# Patient Record
Sex: FEMALE | Race: WHITE
[De-identification: names, ages, dates, MRNs, and addresses within clinical notes are randomized per-mention and may not be internally consistent; named-entity substitution may affect disease eponyms.]

---

## 2018-11-14 NOTE — PROC
[address]  [address]                    PROCEDURE REPORT              
_______________________________________________________________________________
 
Name:       DARSHAN HOOVER                Room:                      [facility]#:  [id number]      Account #:      [id number]  
Admission:  11/14/18     Attend Phys:    Thaddeus English DO 
Discharge:               Date of Birth:  [birth year]  
         Report #: [id number]
                                                                                
_______________________________________________________________________________
THIS REPORT FOR:  //name//                      
 
For GI report, please see the Provation report in Perceptive 7 content.
 
 
 
 
 
 
 
 
 
 
 
 
 
 
 
 
 
 
 
 
 
 
 
 
 
 
 
 
 
 
 
 
 
 
 
 
 
 
 
 
 
                       
                                        By:                                
                 
D: 11/14/18     _______________________________________
T: 11/17/18 1614Medical Records Staff [facility]       /AL

## 2018-11-14 NOTE — EKG
[place] [address]
Phone:  [phone]                     ELECTROCARDIOGRAM REPORT      
_______________________________________________________________________________
 
Name:       SNEHAKYM HUERTANDA S                Room:                      [facility]#:  [id number]      Account #:      [id number]  
Admission:  18     Attend Phys:    Thaddeus English DO 
Discharge:               Date of Birth:  [birth year]  
         Report #: [id number]
    [id number]
_______________________________________________________________________________
THIS REPORT FOR:  //name//                      
 
                          [facility]
                                       
Test Date:    2018               Test Time:    08:41:03
Pat Name:     DARSHAN HOOVER            Department:   
Patient ID:   [id number]            Room:          
Gender:       F                        Technician:   
:          1965               Requested By: Thaddeus English
Order Number: [id number]    Nikki MD:   Nestor Alcazar
                                 Measurements
Intervals                              Axis          
Rate:         68                       P:            -14
LA:           186                      QRS:          36
QRSD:         95                       T:            18
QT:           429                                    
QTc:          457                                    
                           Interpretive Statements
Sinus rhythm
ST elev, probable normal early repol pattern
Compared to ECG 2017 12:41:06
No significant changes
 
Electronically Signed On 2018 11:42:17 CST by Nestor Alcazar
[email or web address]
 
 
 
 
 
 
 
 
 
 
 
 
 
 
 
 
 
 
  <ELECTRONICALLY SIGNED>
                                           By: Nestor Alcazar MD, [facility]      
  18     1142
D: 18   _____________________________________
T: 18   Nestor Alcazar MD, [facility]        /EPI

## 2019-01-22 ENCOUNTER — HOSPITAL ENCOUNTER (OUTPATIENT)
Dept: HOSPITAL 96 - M.RAD | Age: 54
End: 2019-01-22
Attending: INTERNAL MEDICINE
Payer: COMMERCIAL

## 2019-01-22 DIAGNOSIS — Z12.31: Primary | ICD-10-CM

## 2019-03-26 ENCOUNTER — HOSPITAL ENCOUNTER (OUTPATIENT)
Dept: HOSPITAL 96 - M.SLEEPLAB | Age: 54
End: 2019-03-26
Payer: COMMERCIAL

## 2019-03-26 DIAGNOSIS — G47.33: Primary | ICD-10-CM

## 2019-03-26 DIAGNOSIS — F32.9: ICD-10-CM

## 2019-03-26 DIAGNOSIS — M19.90: ICD-10-CM

## 2019-03-26 DIAGNOSIS — E03.9: ICD-10-CM

## 2019-06-18 ENCOUNTER — HOSPITAL ENCOUNTER (OUTPATIENT)
Dept: HOSPITAL 96 - M.LAB | Age: 54
End: 2019-06-18
Attending: NURSE PRACTITIONER
Payer: COMMERCIAL

## 2019-06-18 DIAGNOSIS — E03.8: ICD-10-CM

## 2019-06-18 DIAGNOSIS — E06.3: ICD-10-CM

## 2019-06-18 DIAGNOSIS — Z79.4: ICD-10-CM

## 2019-06-18 DIAGNOSIS — E11.9: Primary | ICD-10-CM

## 2019-06-18 LAB
ABSOLUTE BASOPHILS: 0.1 THOU/UL (ref 0–0.2)
ABSOLUTE EOSINOPHILS: 0.2 THOU/UL (ref 0–0.7)
ABSOLUTE MONOCYTES: 0.5 THOU/UL (ref 0–1.2)
ALBUMIN SERPL-MCNC: 3.5 G/DL (ref 3.4–5)
ALP SERPL-CCNC: 61 U/L (ref 46–116)
ALT SERPL-CCNC: 23 U/L (ref 30–65)
ANION GAP SERPL CALC-SCNC: 10 MMOL/L (ref 7–16)
AST SERPL-CCNC: 15 U/L (ref 15–37)
BASOPHILS NFR BLD AUTO: 1.3 %
BILIRUB SERPL-MCNC: 0.4 MG/DL
BUN SERPL-MCNC: 20 MG/DL (ref 7–18)
CALCIUM SERPL-MCNC: 8.9 MG/DL (ref 8.5–10.1)
CHLORIDE SERPL-SCNC: 103 MMOL/L (ref 98–107)
CHOLEST SERPL-MCNC: 163 MG/DL (ref ?–200)
CO2 SERPL-SCNC: 27 MMOL/L (ref 21–32)
CREAT SERPL-MCNC: 0.8 MG/DL (ref 0.6–1.3)
EOSINOPHIL NFR BLD: 1.9 %
GLUCOSE SERPL-MCNC: 124 MG/DL (ref 70–99)
GRANULOCYTES NFR BLD MANUAL: 71.3 %
HCT VFR BLD CALC: 38.2 % (ref 37–47)
HDLC SERPL-MCNC: 59 MG/DL (ref 40–?)
HGB BLD-MCNC: 12.9 GM/DL (ref 12–15)
LDLC SERPL-MCNC: 85 MG/DL (ref ?–100)
LYMPHOCYTES # BLD: 1.5 THOU/UL (ref 0.8–5.3)
LYMPHOCYTES NFR BLD AUTO: 19.1 %
MCH RBC QN AUTO: 31 PG (ref 26–34)
MCHC RBC AUTO-ENTMCNC: 33.8 G/DL (ref 28–37)
MCV RBC: 91.7 FL (ref 80–100)
MONOCYTES NFR BLD: 6.4 %
MPV: 8.4 FL. (ref 7.2–11.1)
NEUTROPHILS # BLD: 5.7 THOU/UL (ref 1.6–8.1)
NUCLEATED RBCS: 0 /100WBC
PLATELET COUNT*: 233 THOU/UL (ref 150–400)
POTASSIUM SERPL-SCNC: 4.2 MMOL/L (ref 3.5–5.1)
PROT SERPL-MCNC: 7.4 G/DL (ref 6.4–8.2)
RBC # BLD AUTO: 4.17 MIL/UL (ref 4.2–5)
RDW-CV: 15 % (ref 10.5–14.5)
SODIUM SERPL-SCNC: 140 MMOL/L (ref 136–145)
T3RU NFR SERPL: 24 % (ref 24–39)
TC:HDL: 2.8 RATIO
TRIGL SERPL-MCNC: 96 MG/DL (ref ?–150)
VLDLC SERPL CALC-MCNC: 19 MG/DL (ref ?–40)
WBC # BLD AUTO: 8 THOU/UL (ref 4–11)

## 2019-06-19 LAB
EST. AVERAGE GLUCOSE BLD GHB EST-MCNC: 117 MG/DL
GLYCOHEMOGLOBIN (HGB A1C): 5.7 % (ref 4.8–5.6)

## 2019-10-29 ENCOUNTER — HOSPITAL ENCOUNTER (OUTPATIENT)
Dept: HOSPITAL 96 - M.LAB | Age: 54
End: 2019-10-29
Attending: INTERNAL MEDICINE
Payer: COMMERCIAL

## 2019-10-29 DIAGNOSIS — E03.9: ICD-10-CM

## 2019-10-29 DIAGNOSIS — E78.00: ICD-10-CM

## 2019-10-29 DIAGNOSIS — R53.82: ICD-10-CM

## 2019-10-29 DIAGNOSIS — E11.42: Primary | ICD-10-CM

## 2019-10-29 LAB
ABSOLUTE BASOPHILS: 0.1 THOU/UL (ref 0–0.2)
ABSOLUTE EOSINOPHILS: 0.1 THOU/UL (ref 0–0.7)
ABSOLUTE MONOCYTES: 0.4 THOU/UL (ref 0–1.2)
ALBUMIN SERPL-MCNC: 3.3 G/DL (ref 3.4–5)
ALP SERPL-CCNC: 67 U/L (ref 46–116)
ALT SERPL-CCNC: 36 U/L (ref 30–65)
ANION GAP SERPL CALC-SCNC: 10 MMOL/L (ref 7–16)
AST SERPL-CCNC: 23 U/L (ref 15–37)
BASOPHILS NFR BLD AUTO: 0.9 %
BILIRUB SERPL-MCNC: 0.4 MG/DL
BUN SERPL-MCNC: 13 MG/DL (ref 7–18)
CALCIUM SERPL-MCNC: 8.6 MG/DL (ref 8.5–10.1)
CHLORIDE SERPL-SCNC: 103 MMOL/L (ref 98–107)
CHOLEST SERPL-MCNC: 151 MG/DL (ref ?–200)
CO2 SERPL-SCNC: 25 MMOL/L (ref 21–32)
CREAT SERPL-MCNC: 0.7 MG/DL (ref 0.6–1.3)
EOSINOPHIL NFR BLD: 2.5 %
GLUCOSE SERPL-MCNC: 123 MG/DL (ref 70–99)
GRANULOCYTES NFR BLD MANUAL: 62.3 %
HCT VFR BLD CALC: 39.6 % (ref 37–47)
HDLC SERPL-MCNC: 39 MG/DL (ref 40–?)
HGB BLD-MCNC: 13.5 GM/DL (ref 12–15)
LDLC SERPL-MCNC: 94 MG/DL (ref ?–100)
LYMPHOCYTES # BLD: 1.5 THOU/UL (ref 0.8–5.3)
LYMPHOCYTES NFR BLD AUTO: 26.7 %
MCH RBC QN AUTO: 31.1 PG (ref 26–34)
MCHC RBC AUTO-ENTMCNC: 34.2 G/DL (ref 28–37)
MCV RBC: 90.8 FL (ref 80–100)
MONOCYTES NFR BLD: 7.6 %
MPV: 8.2 FL. (ref 7.2–11.1)
NEUTROPHILS # BLD: 3.5 THOU/UL (ref 1.6–8.1)
NUCLEATED RBCS: 0 /100WBC
PLATELET COUNT*: 224 THOU/UL (ref 150–400)
POTASSIUM SERPL-SCNC: 4.1 MMOL/L (ref 3.5–5.1)
PROT SERPL-MCNC: 7.1 G/DL (ref 6.4–8.2)
RBC # BLD AUTO: 4.36 MIL/UL (ref 4.2–5)
RDW-CV: 15.4 % (ref 10.5–14.5)
SODIUM SERPL-SCNC: 138 MMOL/L (ref 136–145)
TC:HDL: 3.9 RATIO
TRIGL SERPL-MCNC: 92 MG/DL (ref ?–150)
VLDLC SERPL CALC-MCNC: 18 MG/DL (ref ?–40)
WBC # BLD AUTO: 5.6 THOU/UL (ref 4–11)

## 2019-10-30 LAB
EST. AVERAGE GLUCOSE BLD GHB EST-MCNC: 128 MG/DL
GLYCOHEMOGLOBIN (HGB A1C): 6.1 % (ref 4.8–5.6)

## 2020-01-05 ENCOUNTER — HOSPITAL ENCOUNTER (EMERGENCY)
Dept: HOSPITAL 96 - M.ERS | Age: 55
Discharge: HOME | End: 2020-01-05
Payer: COMMERCIAL

## 2020-01-05 VITALS — BODY MASS INDEX: 51.91 KG/M2 | HEIGHT: 63 IN | WEIGHT: 293 LBS

## 2020-01-05 VITALS — SYSTOLIC BLOOD PRESSURE: 165 MMHG | DIASTOLIC BLOOD PRESSURE: 84 MMHG
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
## 2020-01-05 DIAGNOSIS — Z88.8: ICD-10-CM

## 2020-01-05 DIAGNOSIS — J45.909: ICD-10-CM

## 2020-01-05 DIAGNOSIS — E11.9: ICD-10-CM

## 2020-01-05 DIAGNOSIS — S40.011A: Primary | ICD-10-CM

## 2020-01-05 DIAGNOSIS — G47.30: ICD-10-CM

## 2020-01-05 DIAGNOSIS — S00.81XA: ICD-10-CM

## 2020-01-05 DIAGNOSIS — M19.90: ICD-10-CM
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
## 2020-01-05 DIAGNOSIS — E66.9: ICD-10-CM
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
## 2020-01-05 DIAGNOSIS — Z79.4: ICD-10-CM

## 2020-01-05 DIAGNOSIS — E03.9: ICD-10-CM

## 2020-01-14 ENCOUNTER — HOSPITAL ENCOUNTER (OUTPATIENT)
Dept: HOSPITAL 96 - M.RAD | Age: 55
End: 2020-01-14
Attending: INTERNAL MEDICINE
Payer: COMMERCIAL

## 2020-01-14 DIAGNOSIS — M17.12: Primary | ICD-10-CM

## 2020-01-14 DIAGNOSIS — J32.9: ICD-10-CM

## 2020-01-24 ENCOUNTER — HOSPITAL ENCOUNTER (OUTPATIENT)
Dept: HOSPITAL 96 - M.RAD | Age: 55
End: 2020-01-24
Attending: OBSTETRICS & GYNECOLOGY
Payer: COMMERCIAL

## 2020-01-24 DIAGNOSIS — Z12.31: Primary | ICD-10-CM

## 2020-03-04 ENCOUNTER — HOSPITAL ENCOUNTER (OUTPATIENT)
Dept: HOSPITAL 96 - M.LAB | Age: 55
End: 2020-03-04
Payer: COMMERCIAL

## 2020-03-04 DIAGNOSIS — E78.2: ICD-10-CM

## 2020-03-04 DIAGNOSIS — E11.9: Primary | ICD-10-CM

## 2020-03-04 DIAGNOSIS — E55.9: ICD-10-CM

## 2020-03-04 DIAGNOSIS — E03.9: ICD-10-CM

## 2020-03-04 LAB
ALBUMIN SERPL-MCNC: 3.5 G/DL (ref 3.4–5)
ALP SERPL-CCNC: 68 U/L (ref 46–116)
ALT SERPL-CCNC: 30 U/L (ref 30–65)
ANION GAP SERPL CALC-SCNC: 9 MMOL/L (ref 7–16)
AST SERPL-CCNC: 14 U/L (ref 15–37)
BILIRUB SERPL-MCNC: 0.3 MG/DL
BUN SERPL-MCNC: 19 MG/DL (ref 7–18)
CALCIUM SERPL-MCNC: 8.7 MG/DL (ref 8.5–10.1)
CHLORIDE SERPL-SCNC: 102 MMOL/L (ref 98–107)
CHOLEST SERPL-MCNC: 169 MG/DL (ref ?–200)
CO2 SERPL-SCNC: 27 MMOL/L (ref 21–32)
CREAT SERPL-MCNC: 0.8 MG/DL (ref 0.6–1.3)
GLUCOSE SERPL-MCNC: 136 MG/DL (ref 70–99)
HDLC SERPL-MCNC: 50 MG/DL (ref 40–?)
LDLC SERPL-MCNC: 92 MG/DL (ref ?–100)
POTASSIUM SERPL-SCNC: 4.3 MMOL/L (ref 3.5–5.1)
PROT SERPL-MCNC: 7.5 G/DL (ref 6.4–8.2)
SODIUM SERPL-SCNC: 138 MMOL/L (ref 136–145)
TC:HDL: 3.4 RATIO
TRIGL SERPL-MCNC: 139 MG/DL (ref ?–150)
VLDLC SERPL CALC-MCNC: 28 MG/DL (ref ?–40)

## 2020-03-05 LAB
EST. AVERAGE GLUCOSE BLD GHB EST-MCNC: 131 MG/DL
GLYCOHEMOGLOBIN (HGB A1C): 6.2 % (ref 4.8–5.6)

## 2020-04-23 ENCOUNTER — HOSPITAL ENCOUNTER (OUTPATIENT)
Dept: HOSPITAL 96 - M.LAB | Age: 55
End: 2020-04-23
Attending: INTERNAL MEDICINE
Payer: COMMERCIAL

## 2020-04-23 DIAGNOSIS — U07.1: Primary | ICD-10-CM

## 2020-06-08 ENCOUNTER — HOSPITAL ENCOUNTER (OUTPATIENT)
Dept: HOSPITAL 96 - M.LAB | Age: 55
End: 2020-06-08
Payer: COMMERCIAL

## 2020-06-08 DIAGNOSIS — E11.9: ICD-10-CM

## 2020-06-08 DIAGNOSIS — E55.9: Primary | ICD-10-CM

## 2020-06-09 LAB
EST. AVERAGE GLUCOSE BLD GHB EST-MCNC: 117 MG/DL
GLYCOHEMOGLOBIN (HGB A1C): 5.7 % (ref 4.8–5.6)

## 2020-07-09 ENCOUNTER — HOSPITAL ENCOUNTER (OUTPATIENT)
Dept: HOSPITAL 96 - M.LAB | Age: 55
Discharge: HOME | End: 2020-07-09
Payer: COMMERCIAL

## 2020-07-09 DIAGNOSIS — Z01.818: Primary | ICD-10-CM

## 2020-07-09 DIAGNOSIS — Z11.59: ICD-10-CM

## 2020-07-13 ENCOUNTER — HOSPITAL ENCOUNTER (OUTPATIENT)
Dept: HOSPITAL 35 - GI | Age: 55
Discharge: HOME | End: 2020-07-13
Payer: COMMERCIAL

## 2020-07-13 DIAGNOSIS — G47.30: ICD-10-CM

## 2020-07-13 DIAGNOSIS — Z79.4: ICD-10-CM

## 2020-07-13 DIAGNOSIS — F41.9: ICD-10-CM

## 2020-07-13 DIAGNOSIS — Z79.899: ICD-10-CM

## 2020-07-13 DIAGNOSIS — K21.9: ICD-10-CM

## 2020-07-13 DIAGNOSIS — E03.9: ICD-10-CM

## 2020-07-13 DIAGNOSIS — K31.9: Primary | ICD-10-CM

## 2020-07-13 DIAGNOSIS — F32.9: ICD-10-CM

## 2020-07-13 DIAGNOSIS — E11.9: ICD-10-CM

## 2020-07-13 DIAGNOSIS — E78.00: ICD-10-CM

## 2020-07-13 DIAGNOSIS — Z88.8: ICD-10-CM

## 2020-07-13 DIAGNOSIS — Z98.890: ICD-10-CM

## 2020-07-17 ENCOUNTER — HOSPITAL ENCOUNTER (OUTPATIENT)
Dept: HOSPITAL 96 - M.ULTRA | Age: 55
End: 2020-07-17
Attending: INTERNAL MEDICINE
Payer: COMMERCIAL

## 2020-07-17 DIAGNOSIS — D25.9: Primary | ICD-10-CM

## 2020-09-16 ENCOUNTER — HOSPITAL ENCOUNTER (OUTPATIENT)
Dept: HOSPITAL 96 - M.LAB | Age: 55
End: 2020-09-16
Payer: COMMERCIAL

## 2020-09-16 DIAGNOSIS — E03.9: ICD-10-CM

## 2020-09-16 DIAGNOSIS — E78.2: Primary | ICD-10-CM

## 2020-09-16 DIAGNOSIS — E11.9: ICD-10-CM

## 2020-09-16 LAB
ALBUMIN SERPL-MCNC: 3.8 G/DL (ref 3.4–5)
ALP SERPL-CCNC: 64 U/L (ref 46–116)
ALT SERPL-CCNC: 27 U/L (ref 30–65)
ANION GAP SERPL CALC-SCNC: 12 MMOL/L (ref 7–16)
AST SERPL-CCNC: 20 U/L (ref 15–37)
BILIRUB SERPL-MCNC: 0.4 MG/DL
BUN SERPL-MCNC: 27 MG/DL (ref 7–18)
CALCIUM SERPL-MCNC: 9.1 MG/DL (ref 8.5–10.1)
CHLORIDE SERPL-SCNC: 102 MMOL/L (ref 98–107)
CHOLEST SERPL-MCNC: 165 MG/DL (ref ?–200)
CO2 SERPL-SCNC: 24 MMOL/L (ref 21–32)
CREAT SERPL-MCNC: 1 MG/DL (ref 0.6–1.3)
GLUCOSE SERPL-MCNC: 118 MG/DL (ref 70–99)
HDLC SERPL-MCNC: 52 MG/DL (ref 40–?)
LDLC SERPL-MCNC: 85 MG/DL (ref ?–100)
POTASSIUM SERPL-SCNC: 3.7 MMOL/L (ref 3.5–5.1)
PROT SERPL-MCNC: 7.8 G/DL (ref 6.4–8.2)
SODIUM SERPL-SCNC: 138 MMOL/L (ref 136–145)
TC:HDL: 3.2 RATIO
TRIGL SERPL-MCNC: 142 MG/DL (ref ?–150)
VLDLC SERPL CALC-MCNC: 28 MG/DL (ref ?–40)

## 2020-09-18 LAB
EST. AVERAGE GLUCOSE BLD GHB EST-MCNC: 111 MG/DL
GLYCOHEMOGLOBIN (HGB A1C): 5.5 % (ref 4.8–5.6)

## 2020-10-12 ENCOUNTER — HOSPITAL ENCOUNTER (OUTPATIENT)
Dept: HOSPITAL 96 - M.LAB | Age: 55
End: 2020-10-12
Payer: COMMERCIAL

## 2020-10-12 DIAGNOSIS — Z20.828: ICD-10-CM

## 2020-10-12 DIAGNOSIS — Z01.812: Primary | ICD-10-CM

## 2020-10-15 ENCOUNTER — HOSPITAL ENCOUNTER (INPATIENT)
Dept: HOSPITAL 35 - TBA | Age: 55
LOS: 2 days | Discharge: HOME | DRG: 989 | End: 2020-10-17
Attending: SURGERY | Admitting: SURGERY
Payer: COMMERCIAL

## 2020-10-15 VITALS — DIASTOLIC BLOOD PRESSURE: 81 MMHG | SYSTOLIC BLOOD PRESSURE: 134 MMHG

## 2020-10-15 VITALS — DIASTOLIC BLOOD PRESSURE: 85 MMHG | SYSTOLIC BLOOD PRESSURE: 158 MMHG

## 2020-10-15 VITALS — DIASTOLIC BLOOD PRESSURE: 96 MMHG | SYSTOLIC BLOOD PRESSURE: 172 MMHG

## 2020-10-15 VITALS — BODY MASS INDEX: 51.91 KG/M2 | HEIGHT: 62.99 IN | WEIGHT: 293 LBS

## 2020-10-15 DIAGNOSIS — E66.01: Primary | ICD-10-CM

## 2020-10-15 DIAGNOSIS — E78.5: ICD-10-CM

## 2020-10-15 DIAGNOSIS — E11.9: ICD-10-CM

## 2020-10-15 DIAGNOSIS — M19.90: ICD-10-CM

## 2020-10-15 DIAGNOSIS — I10: ICD-10-CM

## 2020-10-15 LAB
ALBUMIN SERPL-MCNC: 4.2 G/DL (ref 3.4–5)
ALT SERPL-CCNC: 19 U/L (ref 30–65)
ANION GAP SERPL CALC-SCNC: 12 MMOL/L (ref 7–16)
AST SERPL-CCNC: 16 U/L (ref 15–37)
BILIRUB SERPL-MCNC: 0.6 MG/DL (ref 0.2–1)
BUN SERPL-MCNC: 20 MG/DL (ref 7–18)
CALCIUM SERPL-MCNC: 9.5 MG/DL (ref 8.5–10.1)
CHLORIDE SERPL-SCNC: 102 MMOL/L (ref 98–107)
CO2 SERPL-SCNC: 26 MMOL/L (ref 21–32)
CREAT SERPL-MCNC: 0.7 MG/DL (ref 0.6–1)
ERYTHROCYTE [DISTWIDTH] IN BLOOD BY AUTOMATED COUNT: 14.7 % (ref 10.5–14.5)
GLUCOSE SERPL-MCNC: 122 MG/DL (ref 74–106)
HCT VFR BLD CALC: 41.6 % (ref 37–47)
HGB BLD-MCNC: 13.9 GM/DL (ref 12–15)
MCH RBC QN AUTO: 30.5 PG (ref 26–34)
MCHC RBC AUTO-ENTMCNC: 33.4 G/DL (ref 28–37)
MCV RBC: 91.1 FL (ref 80–100)
PLATELET # BLD: 273 THOU/UL (ref 150–400)
POTASSIUM SERPL-SCNC: 3.8 MMOL/L (ref 3.5–5.1)
PROT SERPL-MCNC: 8.5 G/DL (ref 6.4–8.2)
RBC # BLD AUTO: 4.57 MIL/UL (ref 4.2–5)
SODIUM SERPL-SCNC: 140 MMOL/L (ref 136–145)
WBC # BLD AUTO: 7.4 THOU/UL (ref 4–11)

## 2020-10-15 PROCEDURE — 0DJ08ZZ INSPECTION OF UPPER INTESTINAL TRACT, VIA NATURAL OR ARTIFICIAL OPENING ENDOSCOPIC: ICD-10-PCS | Performed by: SURGERY

## 2020-10-15 PROCEDURE — 56526: CPT

## 2020-10-15 PROCEDURE — 55326: CPT

## 2020-10-15 PROCEDURE — 50222: CPT

## 2020-10-15 PROCEDURE — 50386 REMOVE STENT VIA TRANSURETH: CPT

## 2020-10-15 PROCEDURE — 50739: CPT

## 2020-10-15 PROCEDURE — 50010 RENAL EXPLORATION: CPT

## 2020-10-15 PROCEDURE — 52265 CYSTOSCOPY AND TREATMENT: CPT

## 2020-10-15 PROCEDURE — 50740 FUSION OF URETER & KIDNEY: CPT

## 2020-10-15 PROCEDURE — 57092: CPT

## 2020-10-15 PROCEDURE — 50101: CPT

## 2020-10-15 PROCEDURE — 62110: CPT

## 2020-10-15 PROCEDURE — 56525: CPT

## 2020-10-15 PROCEDURE — 0DBA4ZZ EXCISION OF JEJUNUM, PERCUTANEOUS ENDOSCOPIC APPROACH: ICD-10-PCS | Performed by: SURGERY

## 2020-10-15 PROCEDURE — 56462: CPT

## 2020-10-15 PROCEDURE — 62900: CPT

## 2020-10-15 PROCEDURE — 53307: CPT

## 2020-10-15 PROCEDURE — 52266: CPT

## 2020-10-15 PROCEDURE — 51489: CPT

## 2020-10-15 PROCEDURE — 56531: CPT

## 2020-10-15 PROCEDURE — 50555 KIDNEY ENDOSCOPY & BIOPSY: CPT

## 2020-10-15 PROCEDURE — 70005: CPT

## 2020-10-15 PROCEDURE — 10047: CPT

## 2020-10-15 NOTE — NUR
PT ARRIVED TO FLOOR FROM RR PER BED AT 1625 IN STABLE CONDITION.ADMISSION HX,
ASSESSMENT AND CAREPLAN COMPLETED.VSS.PT C/O SURGICAL SITE PAIN BUT TOO SOON
TO GIVE PAIN MED.PT REPOSITIONED FOR COMFORT.PT UP TO BR WITH SBA TO VOID.
GOOD ENDURANCE NOTED.DR QUINTANA ROUNDED ON PT LATER THIS EVENING.ORDER NOTED.
REPORT OFF NOC RN.

## 2020-10-15 NOTE — O
[facility]
[address]
[place]   [address]                     OPERATIVE REPORT              
_______________________________________________________________________________
 
Name:       DARSHAN HOOVER             Room #:         450-P       [facility] [place]  
M.R.#:      [id number]                       Account #:      [id number]  
Admission:  10/15/20    Attend Phys:    Massimo Ocasio MD    
Discharge:  10/17/20    Date of Birth:  [birth year]  
                                                          Report #: [id number]
                                                                    [id number]   
_______________________________________________________________________________
THIS REPORT FOR:  
 
cc:  Avila Erazo MD, Dean L. MD Joseph, Sigi P. MD                                            ~
CC: Avila Ocasio
 
DATE OF SERVICE:  10/15/2020
 
 
PREOPERATIVE DIAGNOSES:
1.  Morbid obesity.
2.  Hyperlipidemia.
3.  Hypertension.
4.  Type 2 diabetes.
5.  Sleep apnea.
6.  Arthritis.
 
POSTOPERATIVE DIAGNOSES:
1.  Morbid obesity.
2.  Hyperlipidemia.
3.  Hypertension.
4.  Type 2 diabetes.
5.  Sleep apnea.
6.  Arthritis.
 
OPERATIVE PROCEDURE DONE:
1.  Laparoscopic Galen-en-Y gastric bypass.
2.  Upper GI endoscopy.
 
OPERATING SURGEON:  Massimo Ocasio MD
 
INDICATIONS FOR THE PROCEDURE:  The patient is a 55-year-old female who
presented with features of morbid obesity and the above listed comorbidities. 
The patient was noted to have a weight of 150 kilograms with a BMI of 58.  With
the above listed comorbidities, the patient was advised laparoscopic vertical
sleeve gastrectomy and possible hiatal hernia repair.  The patient showed
understanding and agreed to proceed.
 
DESCRIPTION OF PROCEDURE:  After explaining to the patient in detail and
informed consent was obtained, the patient was identified in the preoperative
holding area.  The patient was transferred to the operating room and was placed
in supine position.  Sequential compressive devices were placed for DVT
prophylaxis.  Preoperative antibiotics were given.  After induction of
 
 
 
[facility]
[address] [place]   [address]                     OPERATIVE REPORT              
_______________________________________________________________________________
 
Name:       DARSHAN HOOVER             Room #:         450-P       [facility] [place]  
.R.#:      [id number]                       Account #:      [id number]  
Admission:  10/15/20    Attend Phys:    Massimo Ocasio MD    
Discharge:  10/17/20    Date of Birth:  [birth year]  
                                                          Report #: [id number]
                                                                    [id number]   
_______________________________________________________________________________
anesthesia, the abdomen was prepped and draped in a sterile fashion.  Through a
left upper quadrant 1 cm incision and using Optiview technique, peritoneal
cavity was entered and pneumoperitoneum was created.  Thereafter, under direct
vision, another 5 mm trocar was placed through a left mid abdomen, another 12 mm
trocar was placed through a right mid abdomen, another 5 mm trocar was placed in
the right subcostal region, and through a 1 cm incision in the epigastrium, a
Reynaldo retractor was introduced and the left lobe of the liver was retracted.
 Upon initial inspection, the patient did not have any obvious features of a
hiatal hernia.  I divided the gastrohepatic omentum.  I then divided the medial
fatty tissue with the small blood vessels along the lesser curve at the junction
of the proximal and middle third of the stomach using an Endo-OFE white load
stapler.  I then fired a single green load transversely at this point on the
stomach using an Endo-OFE green load stapler with Mariela-Strips.  I then dissected
the angle of His.  I also did a posterior dissection to release all the
attachments of the stomach on the pancreas.  I then fired the stapler superiorly
up to the angle of His with a 2 Endo-OFE green load stapler with Mariela-Strips to
create completion of the pouch.
 
Once this was completed, I then divided the greater omentum in the middle using
the EnSeal.  The small bowel was identified at the ligament of Treitz and was
measured downstream for about 50 cm.  This loop of bowel was then brought up
onto the gastric pouch.  An enterotomy was made at this point.  A posterior
gastrotomy was made on the gastric pouch, and the posterior gastrojejunostomy
was made using Endo-OFE blue load stapler with anastomosis size of about 2.5-3
cm.  The common gastroenterostomy was then closed using 2 layers of Strattice
sutures.  I then divided the biliary limb just proximal to the anastomosis using
an Endo-OFE blue load stapler with Mariela-Strips.  I then measured the Galen limb
for about 125 cm downstream.  An enterotomy was made at this point.  Another
enterotomy was made on the biliary limb and a side-to-side jejunojejunostomy was
made using a OFE blue load stapler.  The blue load stapler with Mariela-Strips was
used to close the common enteroenterostomy.  Care was taken not to narrow down
the lumen at the anastomosis.  Jejunojejunostomy defect was then closed using
2-0 Ethibond sutures.  I then performed an upper GI endoscopy.  The scope was
introduced into the esophagus, was gradually advanced into the gastric pouch and
the pouch appeared to be of adequate size.  The Galen limb was entered easily. 
An air leak test was performed by instilling air into the stomach and by
irrigation of fluid along the staple line.  There was no leak that was noted. 
The stomach was suctioned out, scope was removed.  I then thoroughly irrigated
the upper abdomen.  Absolute hemostasis was ensured.  Approximately about 10 mL
of lidocaine and Marcaine mix was instilled under the left hemidiaphragm.  The
12 mm port site incision was closed with 0 Vicryl for the fascia.  The Reynaldo
retractor was removed.  Skin was closed with 4-0 Monocryl for all the incisions.
 Dermabond was applied.  The patient was stable at the end of the procedure. 
The patient was awoken from anesthesia and was transferred to the recovery room
in stable condition.
 
 
 
 
[facility]
[address]
[place]   [address]                     OPERATIVE REPORT              
_______________________________________________________________________________
 
Name:       DARSHAN HOOVER             Room #:         450-P       [facility] [place]  
[facility].#:      [id number]                       Account #:      [id number]  
Admission:  10/15/20    Attend Phys:    Massimo Ocasio MD    
Discharge:  10/17/20    Date of Birth:  [birth year]  
                                                          Report #: [id number]
                                                                    [id number]   
_______________________________________________________________________________
ESTIMATED BLOOD LOSS:  Approximately 20 mL.
 
CONDITION OF THE PATIENT:  Stable.
 
FLUIDS GIVEN:  Per anesthesia notes.
 
SPECIMEN SENT:  None.
 
COMPLICATIONS:  None.
 
ANESTHESIA:  General anesthesia.
 
 
 
 
 
 
 
 
 
 
 
 
 
 
 
 
 
 
 
 
 
 
 
 
 
 
 
 
 
 
 
 
 
                         
   By:                               
                   
D: 10/17/20 1658                           _____________________________________
T: 10/17/20 1738                           Massimo Ocasio MD              /nt

## 2020-10-16 VITALS — SYSTOLIC BLOOD PRESSURE: 173 MMHG | DIASTOLIC BLOOD PRESSURE: 93 MMHG

## 2020-10-16 VITALS — DIASTOLIC BLOOD PRESSURE: 97 MMHG | SYSTOLIC BLOOD PRESSURE: 171 MMHG

## 2020-10-16 VITALS — SYSTOLIC BLOOD PRESSURE: 170 MMHG | DIASTOLIC BLOOD PRESSURE: 99 MMHG

## 2020-10-16 VITALS — SYSTOLIC BLOOD PRESSURE: 165 MMHG | DIASTOLIC BLOOD PRESSURE: 98 MMHG

## 2020-10-16 VITALS — DIASTOLIC BLOOD PRESSURE: 99 MMHG | SYSTOLIC BLOOD PRESSURE: 185 MMHG

## 2020-10-16 VITALS — DIASTOLIC BLOOD PRESSURE: 106 MMHG | SYSTOLIC BLOOD PRESSURE: 188 MMHG

## 2020-10-16 LAB
ALBUMIN SERPL-MCNC: 3.9 G/DL (ref 3.4–5)
ALT SERPL-CCNC: 52 U/L (ref 30–65)
ANION GAP SERPL CALC-SCNC: 8 MMOL/L (ref 7–16)
AST SERPL-CCNC: 50 U/L (ref 15–37)
BILIRUB SERPL-MCNC: 0.4 MG/DL (ref 0.2–1)
BUN SERPL-MCNC: 11 MG/DL (ref 7–18)
CALCIUM SERPL-MCNC: 8.9 MG/DL (ref 8.5–10.1)
CHLORIDE SERPL-SCNC: 103 MMOL/L (ref 98–107)
CO2 SERPL-SCNC: 30 MMOL/L (ref 21–32)
CREAT SERPL-MCNC: 0.7 MG/DL (ref 0.6–1)
ERYTHROCYTE [DISTWIDTH] IN BLOOD BY AUTOMATED COUNT: 15 % (ref 10.5–14.5)
EST. AVERAGE GLUCOSE BLD GHB EST-MCNC: 108 MG/DL
GLUCOSE SERPL-MCNC: 129 MG/DL (ref 74–106)
GLYCOHEMOGLOBIN (HGB A1C): 5.4 % (ref 4.8–5.6)
HCT VFR BLD CALC: 41.4 % (ref 37–47)
HGB BLD-MCNC: 13.6 GM/DL (ref 12–15)
MAGNESIUM SERPL-MCNC: 1.8 MG/DL (ref 1.8–2.4)
MCH RBC QN AUTO: 30.2 PG (ref 26–34)
MCHC RBC AUTO-ENTMCNC: 33 G/DL (ref 28–37)
MCV RBC: 91.5 FL (ref 80–100)
PLATELET # BLD: 260 THOU/UL (ref 150–400)
POTASSIUM SERPL-SCNC: 3.4 MMOL/L (ref 3.5–5.1)
PROT SERPL-MCNC: 8.2 G/DL (ref 6.4–8.2)
RBC # BLD AUTO: 4.52 MIL/UL (ref 4.2–5)
SODIUM SERPL-SCNC: 141 MMOL/L (ref 136–145)
WBC # BLD AUTO: 13.5 THOU/UL (ref 4–11)

## 2020-10-16 NOTE — NUR
met with patient and spouse at bedside. PTA patient works at [employer]. She is
inpatient at [facility] for elective sx lap gastric bypass. Independent with adls
pta. PCP Dr Erazo, has health insurance, supportive family. Patient
aniticipates no needs at [place].

## 2020-10-16 NOTE — NUR
PATIENT AOX4 MAKES NEEDS KNOWN.PAIN CONTROLLED THIS SHIFT. PATIENT HAS FOUR
LAP SITE WITH DERMA BOND, ONE ON LEFT LOWER ABD HAS GAUZE, GAUZE IS C/D/I.
PATIENT AMBULATED IN THE UNIT 3 TIMES.  PATIENT C/O INCREASED PAIN CALLED 
D/T HIGH BLOOD PRESSURE AND PAIN NEW ORDER OF TORDOL 30 MG Q 6 HOURS. ORDER TO
MONITOR BLOOD PRESURE TONIGHT.  PATIENT LAST BLOOD PRESSURE /99,86. SCD
ON.  PATIENT HAS C PAP ON. PATIENT IN BED SLEEP AT THIS TIME BREATHING REGULAR
AND UNLABOURED.

## 2020-10-16 NOTE — NUR
PT A&OX4, VSS, PAIN IN ABD. C/O NAUSEA. HYPERTENSION. MEDS GIVEN FOR ALL.
PATIENT AMBULATED WITH  THROUGH PITTS. NEW IV PLACED RIGHT FOREARM.
PATIENT TOLERATING DIET. NO SIGNS OF DISTRESS.

## 2020-10-16 NOTE — NUR
RD consult received.  Pt s/p marisela en y gastric bypass 10/15 for obesity
treatment.  BMI 56.3.  Spoke with pt this am, has been having sips of liquids.
Has gastric sleeve diet order in place.  Pt reports has been working on
lifestyle diet changes since 1/2020.  Start wt was 376 lb, dropped to 372 in
3/2020 and states presurgery wt was 318 lb.  Goes to indoor pool for exercise.
States may have allergy to whey protein, so has been using Fairlight ultra
filtered milk protein drinks (still contains whey), but does much better with
this.  Has diet information for post surgery diet progression.  Since will not
drink Premier or Ensure Max, ok for family to bring in Cinpost for small po
trials.  Pt hopes to go home tomorrow.  Low nutrition risk

## 2020-10-17 VITALS — DIASTOLIC BLOOD PRESSURE: 83 MMHG | SYSTOLIC BLOOD PRESSURE: 160 MMHG

## 2020-10-17 VITALS — SYSTOLIC BLOOD PRESSURE: 160 MMHG | DIASTOLIC BLOOD PRESSURE: 83 MMHG

## 2020-10-17 VITALS — SYSTOLIC BLOOD PRESSURE: 170 MMHG | DIASTOLIC BLOOD PRESSURE: 92 MMHG

## 2020-10-17 NOTE — NUR
PATIENT AMBULATED IN THE UNIT X1 THIS SHIFT. PATIENT AMBULATES WITH STEADY
GAITS. PATIENT HAS 5 LAB SITE NO S/S OF INFECTION ON 4 SITES AND ONE SITE ON
RIGHT LOWER ABD HAS A DRESSING, DRESSING IS C/D/I.  PAIN CONTROLLED THIS
SHIFT. FALL PRECAUTION WITHIN REACH. PATIENT IN BED ASLEEP AT THIS TIME
BREATHING REGULAR AND UNLABOURED.

## 2020-10-17 NOTE — NUR
PATIENT LEFT UNIT AT APPROX 1355, W SPOUSE. IV DISCONTINUED W NO ISSUES. NO
COMPLAINTS AT TIME OF DISCHARGE

## 2020-10-17 NOTE — NUR
ASSUMED CARE OF PATIENT AT APPROX. 0700. ASSESSMENT AS CHARTED. MEDS GIVEN PER
MAR. VSS. PATIENT IS A&OX4 AND MAKES NEEDS KNOWN. PATIENT IS SBA W IV POLE.
FLUIDS INFUSING ON R FA, INTACT W NO ISSUES. PATIENT TOOK A SHOWER THIS AM AND
DID WELL. DR. CLAY SAW PATIENT AND DISCUSSED POSSIBLE D/C TODAY. ALL 5 OF
PATIENT LAPROSCOPIC SITES ARE C/D/I. PATIENT STARTED FULL LIQUIDS TODAY AND IS
TOLERATING WELL. C/O PAIN UNRELIEVED BY HYDROCODONE. PATIENT VOICES NO OTHER
NEEDS BESIDE PAIN. FALL PRECAUTIONS REMAIN IN PLACE. WILL CONTINUE TO MONITOR
AND FOLLOW PLAN OF CARE.

## 2020-11-11 ENCOUNTER — HOSPITAL ENCOUNTER (OUTPATIENT)
Dept: HOSPITAL 96 - M.LAB | Age: 55
End: 2020-11-11
Payer: COMMERCIAL

## 2020-11-11 DIAGNOSIS — E03.9: Primary | ICD-10-CM

## 2021-01-26 ENCOUNTER — HOSPITAL ENCOUNTER (OUTPATIENT)
Dept: HOSPITAL 96 - M.LAB | Age: 56
End: 2021-01-26
Attending: INTERNAL MEDICINE
Payer: COMMERCIAL

## 2021-01-26 DIAGNOSIS — E78.2: Primary | ICD-10-CM

## 2021-01-26 DIAGNOSIS — E11.9: ICD-10-CM

## 2021-01-26 DIAGNOSIS — E03.9: ICD-10-CM

## 2021-01-26 LAB
ALBUMIN SERPL-MCNC: 3.6 G/DL (ref 3.4–5)
ALP SERPL-CCNC: 56 U/L (ref 46–116)
ALT SERPL-CCNC: 29 U/L (ref 30–65)
ANION GAP SERPL CALC-SCNC: 8 MMOL/L (ref 7–16)
AST SERPL-CCNC: 23 U/L (ref 15–37)
BILIRUB SERPL-MCNC: 0.4 MG/DL
BUN SERPL-MCNC: 20 MG/DL (ref 7–18)
CALCIUM SERPL-MCNC: 9 MG/DL (ref 8.5–10.1)
CHLORIDE SERPL-SCNC: 104 MMOL/L (ref 98–107)
CHOLEST SERPL-MCNC: 184 MG/DL (ref ?–200)
CO2 SERPL-SCNC: 26 MMOL/L (ref 21–32)
CREAT SERPL-MCNC: 0.6 MG/DL (ref 0.6–1.3)
GLUCOSE SERPL-MCNC: 93 MG/DL (ref 70–99)
HDLC SERPL-MCNC: 47 MG/DL (ref 40–?)
LDLC SERPL-MCNC: 112 MG/DL (ref ?–100)
POTASSIUM SERPL-SCNC: 4 MMOL/L (ref 3.5–5.1)
PROT SERPL-MCNC: 7 G/DL (ref 6.4–8.2)
SODIUM SERPL-SCNC: 138 MMOL/L (ref 136–145)
TC:HDL: 3.9 RATIO
TRIGL SERPL-MCNC: 126 MG/DL (ref ?–150)
VLDLC SERPL CALC-MCNC: 25 MG/DL (ref ?–40)

## 2021-01-27 ENCOUNTER — HOSPITAL ENCOUNTER (OUTPATIENT)
Dept: HOSPITAL 96 - M.RAD | Age: 56
End: 2021-01-27
Attending: INTERNAL MEDICINE
Payer: COMMERCIAL

## 2021-01-27 DIAGNOSIS — Z12.31: Primary | ICD-10-CM

## 2021-01-27 LAB
EST. AVERAGE GLUCOSE BLD GHB EST-MCNC: 105 MG/DL
GLYCOHEMOGLOBIN (HGB A1C): 5.3 % (ref 4.8–5.6)

## 2021-02-01 ENCOUNTER — HOSPITAL ENCOUNTER (OUTPATIENT)
Dept: HOSPITAL 96 - M.LAB | Age: 56
End: 2021-02-01
Payer: COMMERCIAL

## 2021-02-01 DIAGNOSIS — G47.33: Primary | ICD-10-CM

## 2021-02-01 DIAGNOSIS — E66.01: ICD-10-CM

## 2021-02-01 DIAGNOSIS — M12.9: ICD-10-CM

## 2021-02-01 DIAGNOSIS — I10: ICD-10-CM

## 2021-02-01 DIAGNOSIS — E11.9: ICD-10-CM

## 2021-02-01 DIAGNOSIS — Z98.84: ICD-10-CM

## 2021-02-01 LAB
ALBUMIN SERPL-MCNC: 3.5 G/DL (ref 3.4–5)
ALP SERPL-CCNC: 58 U/L (ref 46–116)
ALT SERPL-CCNC: 32 U/L (ref 30–65)
ANION GAP SERPL CALC-SCNC: 9 MMOL/L (ref 7–16)
AST SERPL-CCNC: 23 U/L (ref 15–37)
BILIRUB SERPL-MCNC: 0.3 MG/DL
BUN SERPL-MCNC: 21 MG/DL (ref 7–18)
CALCIUM SERPL-MCNC: 8.7 MG/DL (ref 8.5–10.1)
CHLORIDE SERPL-SCNC: 103 MMOL/L (ref 98–107)
CHOLEST SERPL-MCNC: 172 MG/DL (ref ?–200)
CO2 SERPL-SCNC: 28 MMOL/L (ref 21–32)
CREAT SERPL-MCNC: 0.8 MG/DL (ref 0.6–1.3)
GLUCOSE SERPL-MCNC: 83 MG/DL (ref 70–99)
HCT VFR BLD CALC: 37.6 % (ref 37–47)
HDLC SERPL-MCNC: 45 MG/DL (ref 40–?)
HGB BLD-MCNC: 12.5 GM/DL (ref 12–15)
IRON SERPL-MCNC: 66 UG/DL (ref 50–175)
LDLC SERPL-MCNC: 103 MG/DL (ref ?–100)
MCH RBC QN AUTO: 30.5 PG (ref 26–34)
MCHC RBC AUTO-ENTMCNC: 33.2 G/DL (ref 28–37)
MCV RBC: 91.7 FL (ref 80–100)
MPV: 8.3 FL. (ref 7.2–11.1)
PLATELET COUNT*: 248 THOU/UL (ref 150–400)
POTASSIUM SERPL-SCNC: 4.1 MMOL/L (ref 3.5–5.1)
PROT SERPL-MCNC: 7.1 G/DL (ref 6.4–8.2)
RBC # BLD AUTO: 4.1 MIL/UL (ref 4.2–5)
RDW-CV: 15.2 % (ref 10.5–14.5)
SAO2 % BLD FROM PO2: 24 % (ref 20–39)
SERUM ASSESSMENT: CLEAR
SODIUM SERPL-SCNC: 140 MMOL/L (ref 136–145)
TC:HDL: 3.8 RATIO
TRIGL SERPL-MCNC: 122 MG/DL (ref ?–150)
VLDLC SERPL CALC-MCNC: 24 MG/DL (ref ?–40)
WBC # BLD AUTO: 7.9 THOU/UL (ref 4–11)

## 2021-02-02 LAB
EST. AVERAGE GLUCOSE BLD GHB EST-MCNC: 103 MG/DL
GLYCOHEMOGLOBIN (HGB A1C): 5.2 % (ref 4.8–5.6)

## 2021-03-25 ENCOUNTER — HOSPITAL ENCOUNTER (OUTPATIENT)
Dept: HOSPITAL 96 - M.RAD | Age: 56
End: 2021-03-25
Attending: INTERNAL MEDICINE
Payer: COMMERCIAL

## 2021-03-25 DIAGNOSIS — M25.511: ICD-10-CM

## 2021-03-25 DIAGNOSIS — G89.29: ICD-10-CM

## 2021-03-25 DIAGNOSIS — M19.011: Primary | ICD-10-CM

## 2021-04-16 ENCOUNTER — HOSPITAL ENCOUNTER (OUTPATIENT)
Dept: HOSPITAL 96 - M.LAB | Age: 56
End: 2021-04-16
Payer: COMMERCIAL

## 2021-04-16 DIAGNOSIS — R21: Primary | ICD-10-CM

## 2021-04-16 LAB
ALBUMIN SERPL-MCNC: 3.6 G/DL (ref 3.4–5)
ALP SERPL-CCNC: 70 U/L (ref 46–116)
ALT SERPL-CCNC: 43 U/L (ref 30–65)
ANION GAP SERPL CALC-SCNC: 5 MMOL/L (ref 7–16)
AST SERPL-CCNC: 25 U/L (ref 15–37)
BILIRUB SERPL-MCNC: 0.4 MG/DL
BUN SERPL-MCNC: 18 MG/DL (ref 7–18)
CALCIUM SERPL-MCNC: 8.8 MG/DL (ref 8.5–10.1)
CHLORIDE SERPL-SCNC: 106 MMOL/L (ref 98–107)
CO2 SERPL-SCNC: 29 MMOL/L (ref 21–32)
CREAT SERPL-MCNC: 0.7 MG/DL (ref 0.6–1.3)
ESR (SEDRATE): 46 MM/HR (ref 0–30)
GLUCOSE SERPL-MCNC: 91 MG/DL (ref 70–99)
HCT VFR BLD CALC: 38.5 % (ref 37–47)
HGB BLD-MCNC: 12.9 GM/DL (ref 12–15)
MCH RBC QN AUTO: 31.1 PG (ref 26–34)
MCHC RBC AUTO-ENTMCNC: 33.4 G/DL (ref 28–37)
MCV RBC: 93.2 FL (ref 80–100)
MPV: 8.4 FL. (ref 7.2–11.1)
PLATELET COUNT*: 259 THOU/UL (ref 150–400)
POTASSIUM SERPL-SCNC: 4.1 MMOL/L (ref 3.5–5.1)
PROT SERPL-MCNC: 7.5 G/DL (ref 6.4–8.2)
RBC # BLD AUTO: 4.14 MIL/UL (ref 4.2–5)
RDW-CV: 13.8 % (ref 10.5–14.5)
SODIUM SERPL-SCNC: 140 MMOL/L (ref 136–145)
WBC # BLD AUTO: 7.6 THOU/UL (ref 4–11)

## 2021-04-30 ENCOUNTER — HOSPITAL ENCOUNTER (OUTPATIENT)
Dept: HOSPITAL 96 - M.MRI | Age: 56
End: 2021-04-30
Attending: ORTHOPAEDIC SURGERY
Payer: COMMERCIAL

## 2021-04-30 DIAGNOSIS — M25.311: ICD-10-CM
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
## 2021-04-30 DIAGNOSIS — M25.411: ICD-10-CM
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
## 2021-04-30 DIAGNOSIS — S46.011A: Primary | ICD-10-CM
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
## 2021-07-29 ENCOUNTER — HOSPITAL ENCOUNTER (OUTPATIENT)
Dept: HOSPITAL 96 - M.ULTRA | Age: 56
End: 2021-07-29
Attending: OBSTETRICS & GYNECOLOGY
Payer: COMMERCIAL

## 2021-07-29 DIAGNOSIS — D25.9: Primary | ICD-10-CM

## 2021-07-29 DIAGNOSIS — N83.202: ICD-10-CM

## 2021-08-02 ENCOUNTER — HOSPITAL ENCOUNTER (OUTPATIENT)
Dept: HOSPITAL 96 - M.LAB | Age: 56
End: 2021-08-02
Attending: OBSTETRICS & GYNECOLOGY
Payer: COMMERCIAL

## 2021-08-02 DIAGNOSIS — N92.1: Primary | ICD-10-CM

## 2021-08-02 DIAGNOSIS — E66.01: ICD-10-CM

## 2021-08-02 DIAGNOSIS — N85.01: ICD-10-CM

## 2021-09-14 ENCOUNTER — HOSPITAL ENCOUNTER (OUTPATIENT)
Dept: HOSPITAL 96 - M.LAB | Age: 56
End: 2021-09-14
Attending: ORTHOPAEDIC SURGERY
Payer: COMMERCIAL

## 2021-09-14 DIAGNOSIS — Z01.812: Primary | ICD-10-CM

## 2021-09-14 DIAGNOSIS — Z20.822: ICD-10-CM

## 2021-10-20 ENCOUNTER — HOSPITAL ENCOUNTER (OUTPATIENT)
Dept: HOSPITAL 96 - M.LAB | Age: 56
End: 2021-10-20
Payer: COMMERCIAL

## 2021-10-20 DIAGNOSIS — E11.9: ICD-10-CM

## 2021-10-20 DIAGNOSIS — M12.9: ICD-10-CM

## 2021-10-20 DIAGNOSIS — I10: Primary | ICD-10-CM

## 2021-10-20 DIAGNOSIS — Z98.84: ICD-10-CM

## 2021-10-20 LAB
ALBUMIN SERPL-MCNC: 3.5 G/DL (ref 3.4–5)
ALP SERPL-CCNC: 74 U/L (ref 46–116)
ALT SERPL-CCNC: 52 U/L (ref 30–65)
ANION GAP SERPL CALC-SCNC: 10 MMOL/L (ref 7–16)
AST SERPL-CCNC: 31 U/L (ref 15–37)
BILIRUB SERPL-MCNC: 0.3 MG/DL
BUN SERPL-MCNC: 17 MG/DL (ref 7–18)
CALCIUM SERPL-MCNC: 8.6 MG/DL (ref 8.5–10.1)
CHLORIDE SERPL-SCNC: 106 MMOL/L (ref 98–107)
CHOLEST SERPL-MCNC: 182 MG/DL (ref ?–200)
CO2 SERPL-SCNC: 27 MMOL/L (ref 21–32)
CREAT SERPL-MCNC: 0.7 MG/DL (ref 0.6–1.3)
GLUCOSE SERPL-MCNC: 95 MG/DL (ref 70–99)
HCT VFR BLD CALC: 39.6 % (ref 37–47)
HDLC SERPL-MCNC: 62 MG/DL (ref 40–?)
HGB BLD-MCNC: 13.2 GM/DL (ref 12–15)
IRON SERPL-MCNC: 82 UG/DL (ref 50–175)
LDLC SERPL-MCNC: 105 MG/DL (ref ?–100)
MCH RBC QN AUTO: 31.4 PG (ref 26–34)
MCHC RBC AUTO-ENTMCNC: 33.4 G/DL (ref 28–37)
MCV RBC: 94 FL (ref 80–100)
MPV: 7.9 FL. (ref 7.2–11.1)
PLATELET COUNT*: 227 THOU/UL (ref 150–400)
POTASSIUM SERPL-SCNC: 4 MMOL/L (ref 3.5–5.1)
PROT SERPL-MCNC: 7.1 G/DL (ref 6.4–8.2)
RBC # BLD AUTO: 4.21 MIL/UL (ref 4.2–5)
RDW-CV: 13.9 % (ref 10.5–14.5)
SAO2 % BLD FROM PO2: 27 % (ref 20–39)
SODIUM SERPL-SCNC: 143 MMOL/L (ref 136–145)
T3RU NFR SERPL: 26 % (ref 24–39)
TC:HDL: 2.9 RATIO
TRIGL SERPL-MCNC: 77 MG/DL (ref ?–150)
VLDLC SERPL CALC-MCNC: 15 MG/DL (ref ?–40)
WBC # BLD AUTO: 5.6 THOU/UL (ref 4–11)

## 2021-10-21 LAB
EST. AVERAGE GLUCOSE BLD GHB EST-MCNC: 105 MG/DL
GLYCOHEMOGLOBIN (HGB A1C): 5.3 % (ref 4.8–5.6)

## 2021-11-01 ENCOUNTER — HOSPITAL ENCOUNTER (EMERGENCY)
Dept: HOSPITAL 96 - M.ERS | Age: 56
Discharge: HOME | End: 2021-11-01
Payer: COMMERCIAL

## 2021-11-01 VITALS — SYSTOLIC BLOOD PRESSURE: 146 MMHG | DIASTOLIC BLOOD PRESSURE: 82 MMHG

## 2021-11-01 VITALS — BODY MASS INDEX: 44.83 KG/M2 | WEIGHT: 253 LBS | HEIGHT: 63 IN

## 2021-11-01 DIAGNOSIS — J45.909: ICD-10-CM

## 2021-11-01 DIAGNOSIS — E66.9: ICD-10-CM
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
## 2021-11-01 DIAGNOSIS — E11.40: ICD-10-CM

## 2021-11-01 DIAGNOSIS — R07.89: Primary | ICD-10-CM
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
## 2021-11-01 DIAGNOSIS — Z79.82: ICD-10-CM

## 2021-11-01 DIAGNOSIS — M25.511: ICD-10-CM

## 2021-11-01 DIAGNOSIS — Z79.4: ICD-10-CM

## 2021-11-01 DIAGNOSIS — Z88.8: ICD-10-CM

## 2021-11-01 DIAGNOSIS — Z91.048: ICD-10-CM
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
## 2021-11-01 DIAGNOSIS — R51.9: ICD-10-CM

## 2021-11-01 DIAGNOSIS — E03.9: ICD-10-CM

## 2021-11-01 DIAGNOSIS — F32.9: ICD-10-CM

## 2021-11-01 DIAGNOSIS — Z98.890: ICD-10-CM
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
## 2021-11-01 DIAGNOSIS — Z79.899: ICD-10-CM

## 2021-11-04 NOTE — EKG
[place] [address]
Phone:  [phone]                     ELECTROCARDIOGRAM REPORT      
_______________________________________________________________________________
 
Name:         DARSHAN HOOVER             Room:                     [facility]#:    [id number]     Account #:     [id number]  
Admission:    21    Attend Phys:                     
Discharge:    21    Date of Birth: [birth year]  
Date of Service: 21  Report #:      [id number]
        [id number]
_______________________________________________________________________________
THIS REPORT FOR:  //name//                      
 
                         [facility] ED
                                       
Test Date:    2021               Test Time:    19:20:31
Pat Name:     DARSHAN HOOVER            Department:   
Patient ID:   [id number]            Room:          
Gender:       F                        Technician:   
:          1965               Requested By: Garland Narayanan
Order Number: [id number] MD:   Fransico Valdivia
                                 Measurements
Intervals                              Axis          
Rate:         63                       P:            -12
DE:           190                      QRS:          18
QRSD:         98                       T:            0
QT:           412                                    
QTc:          422                                    
                           Interpretive Statements
Sinus rhythm
Baseline wander in lead(s) I,aVL
Compared to ECG 2018 08:41:03
ST (T wave) deviation no longer present
Electronically Signed On 2021 8:01:40 CDT by Fransico Valdivia
[email or web address]
 
 
 
 
 
 
 
 
 
 
 
 
 
 
 
 
 
 
 
 
  <ELECTRONICALLY SIGNED>
                                           By: Fransico Valdivia MD, FACC   
  21
D: 21   _____________________________________
T: 21   Fransico Valdivia MD, FAC     /EPI

## 2021-11-22 ENCOUNTER — HOSPITAL ENCOUNTER (OUTPATIENT)
Dept: HOSPITAL 96 - M.ULTRA | Age: 56
End: 2021-11-22
Attending: OBSTETRICS & GYNECOLOGY
Payer: COMMERCIAL

## 2021-11-22 DIAGNOSIS — Z78.0: ICD-10-CM

## 2021-11-22 DIAGNOSIS — N92.1: Primary | ICD-10-CM

## 2021-11-22 DIAGNOSIS — N83.202: ICD-10-CM

## 2021-12-02 ENCOUNTER — HOSPITAL ENCOUNTER (OUTPATIENT)
Dept: HOSPITAL 96 - M.MRI | Age: 56
End: 2021-12-02
Attending: INTERNAL MEDICINE
Payer: COMMERCIAL

## 2021-12-02 DIAGNOSIS — I67.82: Primary | ICD-10-CM

## 2021-12-02 DIAGNOSIS — R42: ICD-10-CM

## 2021-12-02 DIAGNOSIS — R90.82: ICD-10-CM

## 2022-01-25 ENCOUNTER — HOSPITAL ENCOUNTER (OUTPATIENT)
Dept: HOSPITAL 96 - M.RAD | Age: 57
End: 2022-01-25
Attending: OBSTETRICS & GYNECOLOGY
Payer: COMMERCIAL

## 2022-01-25 DIAGNOSIS — M25.761: ICD-10-CM

## 2022-01-25 DIAGNOSIS — Z12.31: Primary | ICD-10-CM

## 2022-01-25 DIAGNOSIS — M17.0: ICD-10-CM

## 2022-01-25 DIAGNOSIS — M25.762: ICD-10-CM

## 2024-06-07 NOTE — SLEEP
[address]  [address]                    SLEEP STUDY REPORT            
_______________________________________________________________________________
 
Name:       DARSHAN HOOVER              Room:                      [facility] 
M.R.#:  [id number]      Account #:      [id number]  
Admission:  03/26/19     Attend Phys:    Sweetie Pathak MD   
Discharge:               Date of Birth:  [birth year]  
         Report #: [id number]
                                                                     [id number]  
_______________________________________________________________________________
THIS REPORT FOR:  //name//                      
 
CC: Avila Pathak MD
 
This study has been reviewed in its entirety by a board certified sleep
specialist
 
DATE OF SERVICE:  03/26/2019
 
 
SLEEP STUDY
 
ATTENDING PHYSICIAN:  Dr. Pathak.
 
The patient is a 54-year-old who weighs 366 pounds with a BMI of 62.8.  The
patient's Belden score was 12.  The patient was referred for CPAP titration
study.
 
During the night study, the patient spent 480 minutes in bed and slept for 386
minutes with a sleep efficiency of 80%.  Sleep latency was 22 minutes with a REM
latency of 131 minutes.  Overall, sleep architecture showed normal stage I and
stage 2 sleep, normal slow wave and normal REM sleep.
 
EKG monitoring revealed mean heart rate of 66 beats per minute.  No sustained
arrhythmias observed.
 
No clinically significant PLMS observed.
 
The patient was started on CPAP at a pressure of 15 cm water and titrated up to
17 cm water.  At the final pressure, the patient slept for 265 minutes period
including 73 minutes of REM sleep.  The patient had supine sleep as well.  The
patient's AHI was reduced to 0 per hour and oxygen saturation remained above
92%.
 
IMPRESSION:
1.  Sleep apnea diagnosed previously.
2.  No clinically significant periodic limb movements.
 
RECOMMENDATIONS:
1.  CPAP at 17 cm water completely eliminated the patient's sleep apnea and
should be used on a nightly basis.
2.  Follow up in 4-6 weeks to assess compliance with CPAP and to document
clinical improvement.
3.  Weight loss is strongly advised.
 
 
 
[place] [address]                    SLEEP STUDY REPORT            
_______________________________________________________________________________
 
Name:       DARSHAN HOOVER              Room:                      [facility] 
M.R.#:  [id number]      Account #:      [id number]  
Admission:  03/26/19     Attend Phys:    Sweetie Pathak MD   
Discharge:               Date of Birth:  [birth year]  
         Report #: [id number]
                                                                     [id number]  
_______________________________________________________________________________
4.  Avoid CNS depressants.
5.  Cautioned regarding driving until symptoms of sleep apnea resolve with the
use of CPAP.
 
 
 
 
 
 
 
 
 
 
 
 
 
 
 
 
 
 
 
 
 
 
 
 
 
 
 
 
 
 
 
 
 
 
 
 
 
 
 
 
 
<ELECTRONICALLY SIGNED>
                                        By:  Reinier Davis MD              
03/29/19     0755
D: 03/28/19 1651_______________________________________
T: 03/28/19 1710Reinier Davis MD                 /nt
room air